# Patient Record
Sex: FEMALE | URBAN - METROPOLITAN AREA
[De-identification: names, ages, dates, MRNs, and addresses within clinical notes are randomized per-mention and may not be internally consistent; named-entity substitution may affect disease eponyms.]

---

## 2021-08-11 ENCOUNTER — OFFICE VISIT (OUTPATIENT)
Dept: URBAN - METROPOLITAN AREA CLINIC 32 | Facility: CLINIC | Age: 86
End: 2021-08-11
Payer: OTHER MISCELLANEOUS

## 2021-08-11 DIAGNOSIS — H25.12 AGE-RELATED NUCLEAR CATARACT, LEFT EYE: ICD-10-CM

## 2021-08-11 DIAGNOSIS — H25.11 AGE-RELATED NUCLEAR CATARACT, RIGHT EYE: Primary | ICD-10-CM

## 2021-08-11 PROCEDURE — 99204 OFFICE O/P NEW MOD 45 MIN: CPT | Performed by: OPTOMETRIST

## 2021-08-11 ASSESSMENT — VISUAL ACUITY
OD: 20/40
OS: 20/40

## 2021-08-11 ASSESSMENT — KERATOMETRY
OS: 45.38
OD: 44.63

## 2021-08-11 ASSESSMENT — INTRAOCULAR PRESSURE
OS: 10
OD: 11

## 2021-08-11 NOTE — IMPRESSION/PLAN
Impression: Age-related nuclear cataract, right eye: H25.11. Plan: Cataracts account for the patient's complaints. Patient education was discussed regarding cataracts, lens options and surgery. Recommend Cataract consultation with surgeon. Order A-Scan and Corneal Topography. Hold off on filling any new glasses prescriptions until after the consultation. RTC 1-2 weeks for cataract consultation.

## 2021-08-17 ENCOUNTER — OFFICE VISIT (OUTPATIENT)
Dept: URBAN - METROPOLITAN AREA CLINIC 32 | Facility: CLINIC | Age: 86
End: 2021-08-17
Payer: OTHER MISCELLANEOUS

## 2021-08-17 PROCEDURE — 99204 OFFICE O/P NEW MOD 45 MIN: CPT | Performed by: OPHTHALMOLOGY

## 2021-08-17 ASSESSMENT — INTRAOCULAR PRESSURE
OD: 13
OS: 11

## 2021-08-17 NOTE — IMPRESSION/PLAN
Impression: Age-related nuclear cataract, bilateral: H25.13. Condition: quality of life issue. Symptoms: could improve with surgery. Vision: vision affected. Plan: Cataracts account for the patient's complaints. Discussed all risks, benefits, procedures and recovery. Patient has quality of life issues. Patient understands changing glasses will not improve vision. Patient desires to have surgery, recommend phacoemulsification with intraocular lens. CE w/IOL OD then OS. R/B/A discussed. RL2. Lens: standard   Aim: plano. Dexycu  will be used.

## 2021-09-16 ENCOUNTER — TESTING ONLY (OUTPATIENT)
Dept: URBAN - METROPOLITAN AREA CLINIC 32 | Facility: CLINIC | Age: 86
End: 2021-09-16
Payer: OTHER MISCELLANEOUS

## 2021-09-16 DIAGNOSIS — H25.13 AGE-RELATED NUCLEAR CATARACT, BILATERAL: Primary | ICD-10-CM

## 2021-09-16 PROCEDURE — 92025 CPTRIZED CORNEAL TOPOGRAPHY: CPT | Performed by: OPHTHALMOLOGY

## 2021-09-16 PROCEDURE — 76519 ECHO EXAM OF EYE: CPT | Performed by: OPHTHALMOLOGY

## 2021-09-16 ASSESSMENT — PACHYMETRY
OS: 3.42
OS: 22.61
OD: 3.29
OD: 22.54

## 2021-09-30 ENCOUNTER — SURGERY (OUTPATIENT)
Dept: URBAN - METROPOLITAN AREA SURGERY 11 | Facility: SURGERY | Age: 86
End: 2021-09-30
Payer: OTHER MISCELLANEOUS

## 2021-09-30 PROCEDURE — 66984 XCAPSL CTRC RMVL W/O ECP: CPT | Performed by: OPHTHALMOLOGY

## 2021-10-01 ENCOUNTER — POST-OPERATIVE VISIT (OUTPATIENT)
Dept: URBAN - METROPOLITAN AREA CLINIC 23 | Facility: CLINIC | Age: 86
End: 2021-10-01

## 2021-10-01 ASSESSMENT — INTRAOCULAR PRESSURE
OS: 12
OD: 13

## 2021-10-01 NOTE — IMPRESSION/PLAN
Impression: S/P Cataract Extraction by phacoemulsification with IOL placement; ORA OS - 1 Day. Encounter for surgical aftercare following surgery on a sense organ  Z48.810. Post operative instructions reviewed - Plan: Return as scheduled. Sooner if sudden vision change or increasing pain. Discussed pt. may see floater/Dexycu. Double vision will go away. --Advised patient to use artificial tears for comfort.

## 2021-10-06 ENCOUNTER — POST-OPERATIVE VISIT (OUTPATIENT)
Dept: URBAN - METROPOLITAN AREA CLINIC 23 | Facility: CLINIC | Age: 86
End: 2021-10-06

## 2021-10-06 DIAGNOSIS — Z48.810 ENCOUNTER FOR SURGICAL AFTERCARE FOLLOWING SURGERY ON A SENSE ORGAN: Primary | ICD-10-CM

## 2021-10-06 PROCEDURE — 99024 POSTOP FOLLOW-UP VISIT: CPT | Performed by: OPHTHALMOLOGY

## 2021-10-06 ASSESSMENT — INTRAOCULAR PRESSURE
OD: 13
OS: 12

## 2021-10-06 NOTE — IMPRESSION/PLAN
Impression: S/P Cataract Extraction by phacoemulsification with IOL placement; ORA OS - 6 Days. Encounter for surgical aftercare following surgery on a sense organ  Z48.810. Excellent post op course   Post operative instructions reviewed - Condition is improving - Plan: S/P Cataract Extraction by phacoemulsification with IOL placement; ORA OS - 6 Days. Encounter for surgical aftercare following surgery on a sense organ  Z48.810. Excellent post op course   Post operative instructions reviewed - Condition is improving - Exam shows PC IOL in place intact and well centered. --Advised patient to use artificial tears for comfort.

## 2021-10-14 ENCOUNTER — SURGERY (OUTPATIENT)
Dept: URBAN - METROPOLITAN AREA SURGERY 11 | Facility: SURGERY | Age: 86
End: 2021-10-14
Payer: OTHER MISCELLANEOUS

## 2021-10-14 PROCEDURE — 66984 XCAPSL CTRC RMVL W/O ECP: CPT | Performed by: OPHTHALMOLOGY

## 2021-10-15 ENCOUNTER — POST-OPERATIVE VISIT (OUTPATIENT)
Dept: URBAN - METROPOLITAN AREA CLINIC 40 | Facility: CLINIC | Age: 86
End: 2021-10-15
Payer: OTHER MISCELLANEOUS

## 2021-10-15 ASSESSMENT — INTRAOCULAR PRESSURE
OD: 26
OS: 22

## 2021-10-15 NOTE — IMPRESSION/PLAN
Impression: S/P Cataract Extraction by phacoemulsification with IOL placement; DEXYCU OD - 1 Day. Presence of intraocular lens  Z96.1. Excellent post op course   Post operative instructions reviewed - Plan: Instructions given. Use artificial tears as needed. --Advised patient to use artificial tears for comfort.

## 2021-10-21 ENCOUNTER — POST-OPERATIVE VISIT (OUTPATIENT)
Dept: URBAN - METROPOLITAN AREA CLINIC 40 | Facility: CLINIC | Age: 86
End: 2021-10-21
Payer: OTHER MISCELLANEOUS

## 2021-10-21 PROCEDURE — 99024 POSTOP FOLLOW-UP VISIT: CPT | Performed by: OPTOMETRIST

## 2021-10-21 ASSESSMENT — INTRAOCULAR PRESSURE
OD: 13
OS: 13

## 2021-10-21 NOTE — IMPRESSION/PLAN
Impression: S/P Cataract Extraction by phacoemulsification with IOL placement; DEXYCU OD - 7 Days. Presence of intraocular lens  Z96.1. Excellent post op course   Post operative instructions reviewed - Plan: Instructions given. Use artificial tears as needed. --Advised patient to use artificial tears for comfort.

## 2021-11-12 ENCOUNTER — POST-OPERATIVE VISIT (OUTPATIENT)
Dept: URBAN - METROPOLITAN AREA CLINIC 40 | Facility: CLINIC | Age: 86
End: 2021-11-12
Payer: OTHER MISCELLANEOUS

## 2021-11-12 DIAGNOSIS — Z96.1 PRESENCE OF INTRAOCULAR LENS: Primary | ICD-10-CM

## 2021-11-12 PROCEDURE — 99024 POSTOP FOLLOW-UP VISIT: CPT | Performed by: OPTOMETRIST

## 2021-11-12 ASSESSMENT — INTRAOCULAR PRESSURE
OD: 22
OS: 23

## 2021-11-12 NOTE — IMPRESSION/PLAN
Impression: S/P Cataract Extraction by phacoemulsification with IOL placement; DEXYCU OD - 29 Days. Presence of intraocular lens  Z96.1. Excellent post op course   Post operative instructions reviewed - Plan: Schedule vision exam. --Advised patient to use artificial tears for comfort.

## 2022-02-04 ENCOUNTER — OFFICE VISIT (OUTPATIENT)
Dept: URBAN - METROPOLITAN AREA CLINIC 40 | Facility: CLINIC | Age: 87
End: 2022-02-04
Payer: OTHER MISCELLANEOUS

## 2022-02-04 DIAGNOSIS — H11.31 CONJUNCTIVAL HEMORRHAGE, RIGHT EYE: Primary | ICD-10-CM

## 2022-02-04 PROCEDURE — 99213 OFFICE O/P EST LOW 20 MIN: CPT | Performed by: OPTOMETRIST

## 2022-02-04 NOTE — IMPRESSION/PLAN
Impression: Other secondary cataract, bilateral: H26.493. Plan: Return in 1-2 weeks with Yahir Nieves MD for YAG capsulotomy consult.

## 2022-03-15 ENCOUNTER — OFFICE VISIT (OUTPATIENT)
Dept: URBAN - METROPOLITAN AREA CLINIC 40 | Facility: CLINIC | Age: 87
End: 2022-03-15
Payer: OTHER MISCELLANEOUS

## 2022-03-15 DIAGNOSIS — H26.493 OTHER SECONDARY CATARACT, BILATERAL: Primary | ICD-10-CM

## 2022-03-15 PROCEDURE — 99214 OFFICE O/P EST MOD 30 MIN: CPT | Performed by: OPHTHALMOLOGY

## 2022-03-15 ASSESSMENT — KERATOMETRY
OD: 45.25
OS: 45.25

## 2022-03-15 ASSESSMENT — VISUAL ACUITY
OD: 20/25
OS: 20/25

## 2022-03-15 ASSESSMENT — INTRAOCULAR PRESSURE
OS: 17
OD: 16

## 2022-03-15 NOTE — IMPRESSION/PLAN
Impression: Other secondary cataract, bilateral: H26.493. Genesis Ek Visually significant, quality of life issue, could improve with surgery. Plan: Discussed all risks, benefits, alternatives, procedures and recovery. Patient understands changing glasses will not improve vision. Patient desires to have surgery, recommend yag capsulotomy OS then OD.

## 2022-04-12 ENCOUNTER — OFFICE VISIT (OUTPATIENT)
Dept: URBAN - METROPOLITAN AREA CLINIC 40 | Facility: CLINIC | Age: 87
End: 2022-04-12
Payer: OTHER MISCELLANEOUS

## 2022-04-12 DIAGNOSIS — H02.105 ECTROPION OF LEFT LOWER EYELID: Primary | ICD-10-CM

## 2022-04-12 PROCEDURE — 99213 OFFICE O/P EST LOW 20 MIN: CPT | Performed by: OPTOMETRIST

## 2022-04-12 NOTE — IMPRESSION/PLAN
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 Plan: Discussed diagnosis in detail with patient. Will continue to observe condition and or symptoms. Try OTC allergy drops. Patient instructed to apply cold compresses. May need ectropion repair in the future.

## 2022-04-18 ENCOUNTER — SURGERY (OUTPATIENT)
Dept: URBAN - METROPOLITAN AREA SURGERY 11 | Facility: SURGERY | Age: 87
End: 2022-04-18
Payer: OTHER MISCELLANEOUS

## 2022-04-18 PROCEDURE — 66821 AFTER CATARACT LASER SURGERY: CPT | Performed by: OPHTHALMOLOGY

## 2022-04-27 ENCOUNTER — POST-OPERATIVE VISIT (OUTPATIENT)
Dept: URBAN - METROPOLITAN AREA CLINIC 40 | Facility: CLINIC | Age: 87
End: 2022-04-27
Payer: OTHER MISCELLANEOUS

## 2022-04-27 PROCEDURE — 99024 POSTOP FOLLOW-UP VISIT: CPT | Performed by: OPTOMETRIST

## 2022-04-27 ASSESSMENT — INTRAOCULAR PRESSURE
OD: 17
OS: 17

## 2022-04-27 NOTE — IMPRESSION/PLAN
Impression: S/P YAG Capsulotomy (Yttrium Aluminum [place]) OS - 9 Days. Encounter for surgical aftercare following surgery on a sense organ  Z48.810. Excellent post op course Plan: Schedule YAG on other eye. --Advised patient to use artificial tears for comfort.

## 2022-05-02 ENCOUNTER — SURGERY (OUTPATIENT)
Dept: URBAN - METROPOLITAN AREA SURGERY 11 | Facility: SURGERY | Age: 87
End: 2022-05-02
Payer: OTHER MISCELLANEOUS

## 2022-05-02 PROCEDURE — 66821 AFTER CATARACT LASER SURGERY: CPT | Performed by: OPHTHALMOLOGY

## 2022-05-09 ENCOUNTER — POST-OPERATIVE VISIT (OUTPATIENT)
Dept: URBAN - METROPOLITAN AREA CLINIC 40 | Facility: CLINIC | Age: 87
End: 2022-05-09
Payer: OTHER MISCELLANEOUS

## 2022-05-09 DIAGNOSIS — Z48.810 ENCOUNTER FOR SURGICAL AFTERCARE FOLLOWING SURGERY ON A SENSE ORGAN: Primary | ICD-10-CM

## 2022-05-09 PROCEDURE — 99024 POSTOP FOLLOW-UP VISIT: CPT | Performed by: OPTOMETRIST

## 2022-05-09 ASSESSMENT — INTRAOCULAR PRESSURE
OD: 17
OS: 17

## 2022-05-09 NOTE — IMPRESSION/PLAN
Impression: S/P YAG Capsulotomy (Yttrium Aluminum [place]) OD - 7 Days. Encounter for surgical aftercare following surgery on a sense organ  Z48.810. Excellent post op course Plan: Monitor. Schedule refraction. --Advised patient to use artificial tears for comfort.

## 2023-03-31 ENCOUNTER — OFFICE VISIT (OUTPATIENT)
Dept: URBAN - METROPOLITAN AREA CLINIC 40 | Facility: CLINIC | Age: 88
End: 2023-03-31
Payer: OTHER MISCELLANEOUS

## 2023-03-31 DIAGNOSIS — H35.61 RETINAL HEMORRHAGE, RIGHT EYE: ICD-10-CM

## 2023-03-31 DIAGNOSIS — H04.123 DRY EYE SYNDROME OF BILATERAL LACRIMAL GLANDS: Primary | ICD-10-CM

## 2023-03-31 PROCEDURE — 99214 OFFICE O/P EST MOD 30 MIN: CPT | Performed by: OPTOMETRIST

## 2023-03-31 ASSESSMENT — KERATOMETRY
OS: 45.38
OD: 45.00

## 2023-03-31 ASSESSMENT — INTRAOCULAR PRESSURE
OS: 13
OD: 13